# Patient Record
Sex: FEMALE | Race: OTHER | Employment: FULL TIME | URBAN - METROPOLITAN AREA
[De-identification: names, ages, dates, MRNs, and addresses within clinical notes are randomized per-mention and may not be internally consistent; named-entity substitution may affect disease eponyms.]

---
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
## 2021-03-31 ENCOUNTER — HOSPITAL ENCOUNTER (EMERGENCY)
Age: 21
Discharge: HOME | End: 2021-03-31
Attending: EMERGENCY MEDICINE
Payer: COMMERCIAL

## 2021-03-31 VITALS
HEART RATE: 108 BPM | WEIGHT: 180 LBS | RESPIRATION RATE: 16 BRPM | SYSTOLIC BLOOD PRESSURE: 140 MMHG | TEMPERATURE: 98 F | OXYGEN SATURATION: 100 % | BODY MASS INDEX: 33.99 KG/M2 | DIASTOLIC BLOOD PRESSURE: 99 MMHG | HEIGHT: 61 IN

## 2021-03-31 DIAGNOSIS — L30.0 NUMMULAR ECZEMATOUS DERMATITIS: Primary | ICD-10-CM

## 2021-03-31 PROCEDURE — 99282 EMERGENCY DEPT VISIT SF MDM: CPT

## 2021-03-31 RX ORDER — FLUTICASONE PROPIONATE 0.05 MG/G
OINTMENT TOPICAL 2 TIMES DAILY
Qty: 15 G | Refills: 0 | Status: SHIPPED | OUTPATIENT
Start: 2021-03-31

## 2021-03-31 RX ORDER — DIPHENHYDRAMINE HCL 25 MG
25 CAPSULE ORAL
Qty: 20 CAP | Refills: 0 | Status: SHIPPED | OUTPATIENT
Start: 2021-03-31 | End: 2021-04-10

## 2021-03-31 NOTE — ED PROVIDER NOTES
EMERGENCY DEPARTMENT HISTORY AND PHYSICAL EXAM    Date: 3/31/2021  Patient Name: Nisha Mauricio    History of Presenting Illness     Chief Complaint   Patient presents with    Allergic Reaction         History Provided By: Patient    Additional History (Context):   Nisha Mauricio is a 24 y.o. female with PMHX seborrheic dermatitis presents to the emergency department via private vehicle C/O hand rash x1 day. Patient reports that she tried over-the-counter cortisone and his friend that it may be worse. She reports that the rash stings and is pruritic. Pt denies chest pain, shortness of breath, sore throat, throat swelling, and any other sxs or complaints. No relieving or exacerbating factors identified. Denies any mucosal rashes. Denies any rashes to her genitals. PCP: Darlene Le MD    Current Outpatient Medications   Medication Sig Dispense Refill    fluticasone propionate (CUTIVASE) 0.005 % ointment Apply  to affected area two (2) times a day. apply thin layer as directed 15 g 0    diphenhydrAMINE (BenadryL) 25 mg capsule Take 1 Cap by mouth every six (6) hours as needed for Itching or Skin Irritation for up to 10 days. 20 Cap 0    omeprazole (PRILOSEC OTC) 20 mg tablet Take 20 mg by mouth daily.  fluticasone propionate (FLOVENT IN) Take  by inhalation.  ibuprofen (MOTRIN IB) 200 mg tablet Take  by mouth. Past History     Past Medical History:  Past Medical History:   Diagnosis Date    Esophageal problem     Food allergy        Past Surgical History:  No past surgical history on file. Family History:  No family history on file. Social History:  Social History     Tobacco Use    Smoking status: Never Smoker    Smokeless tobacco: Never Used   Substance Use Topics    Alcohol use: No    Drug use: No       Allergies:   Allergies   Allergen Reactions    Green Bean Anaphylaxis    Peanut Anaphylaxis    Sesame Seed Unknown (comments)     Allergy testing    Tree Nut Unknown (comments)     Allergy testing           Review of Systems   Review of Systems   Constitutional: Negative for chills and fever. HENT: Negative for congestion, ear pain, sinus pain and sore throat. Eyes: Negative for pain and visual disturbance. Respiratory: Negative for cough and shortness of breath. Cardiovascular: Negative for chest pain and leg swelling. Gastrointestinal: Negative for abdominal pain, constipation, diarrhea, nausea and vomiting. Genitourinary: Negative for dysuria, hematuria, vaginal bleeding and vaginal discharge. Musculoskeletal: Negative for back pain and neck pain. Skin: Positive for rash. Negative for pallor. Neurological: Negative for dizziness, tremors, weakness, light-headedness and headaches. All other systems reviewed and are negative. Physical Exam     Vitals:    03/31/21 0134   BP: (!) 140/99   Pulse: (!) 108   Resp: 16   Temp: 98 °F (36.7 °C)   SpO2: 100%   Weight: 81.6 kg (180 lb)   Height: 5' 1\" (1.549 m)     Physical Exam    Nursing note and vitals reviewed    Constitutional: Sanket Damon female, no acute distress  Head: Normocephalic, Atraumatic  Eyes: Pupils are equal, round, and reactive to light, EOMI  Neck: Supple, non-tender  Chest: Normal work of breathing and chest excursion bilaterally  Back: No evidence of trauma or deformity  Extremities: No evidence of trauma or deformity, no LE edema. No streaking erythema, vesicular lesions, ulcerations or bulla  Skin: Warm and dry, normal cap refill. Circular scattered erythematous and slightly raised maculopapular rash along the palmar aspects of both hands. Negative Nikolsky sign. No sloughing. Neuro: Alert and appropriate, CN intact, normal speech, moving all 4 extremities freely and symmetrically  Psychiatric: Normal mood and affect       Diagnostic Study Results     Labs -   No results found for this or any previous visit (from the past 12 hour(s)).     Radiologic Studies -   No orders to display     CT Results  (Last 48 hours)    None        CXR Results  (Last 48 hours)    None            Medical Decision Making   I am the first provider for this patient. I reviewed the vital signs, available nursing notes, past medical history, past surgical history, family history and social history. Vital Signs-Reviewed the patient's vital signs. Pulse Oximetry Analysis -100% on room air    Cardiac Monitor:  Rate: 108 bpm  Rhythm: Regular    Records Reviewed: Nursing Notes and Old Medical Records    Provider Notes:   24 y.o. female presenting with a rash to her hands. On exam patient has maculopapular slightly raised erythematous circular rashes scattered along the palmar aspects of both hands. No sloughing. Negative Nikolsky sign. Patient's exam is most consistent with palmar nummular dermatitis. Will discharge with topical steroids and Benadryl. Procedures:  Procedures    ED Course:   1:38 AM   Initial assessment performed. The patients presenting problems have been discussed, and they are in agreement with the care plan formulated and outlined with them. I have encouraged them to ask questions as they arise throughout their visit. Diagnosis and Disposition       DISCHARGE NOTE:  2:06 AM    Austin Flynn's  results have been reviewed with her. She has been counseled regarding her diagnosis, treatment, and plan. She verbally conveys understanding and agreement of the signs, symptoms, diagnosis, treatment and prognosis and additionally agrees to follow up as discussed. She also agrees with the care-plan and conveys that all of her questions have been answered. I have also provided discharge instructions for her that include: educational information regarding their diagnosis and treatment, and list of reasons why they would want to return to the ED prior to their follow-up appointment, should her condition change. She has been provided with education for proper emergency department utilization. CLINICAL IMPRESSION:    1. Nummular eczematous dermatitis        PLAN:  1. D/C Home  2. Discharge Medication List as of 3/31/2021  1:55 AM      START taking these medications    Details   fluticasone propionate (CUTIVASE) 0.005 % ointment Apply  to affected area two (2) times a day. apply thin layer as directed, Normal, Disp-15 g, R-0      diphenhydrAMINE (BenadryL) 25 mg capsule Take 1 Cap by mouth every six (6) hours as needed for Itching or Skin Irritation for up to 10 days. , Normal, Disp-20 Cap, R-0         CONTINUE these medications which have NOT CHANGED    Details   omeprazole (PRILOSEC OTC) 20 mg tablet Take 20 mg by mouth daily. , Historical Med      fluticasone propionate (FLOVENT IN) Take  by inhalation. , Historical Med      ibuprofen (MOTRIN IB) 200 mg tablet Take  by mouth., Historical Med           3. Follow-up Information     Follow up With Specialties Details Why Contact Info    your PCP  Schedule an appointment as soon as possible for a visit in 2 days      [facility] EMERGENCY DEPT Emergency Medicine  As needed if symptoms worsen [address] [id number]  [phone]        ____________________________________     Please note that this dictation was completed with Fast[employer], the computer voice recognition software. Quite often unanticipated grammatical, syntax, homophones, and other interpretive errors are inadvertently transcribed by the computer software. Please disregard these errors. Please excuse any errors that have escaped final proofreading.

## 2021-07-23 ENCOUNTER — HOSPITAL ENCOUNTER (EMERGENCY)
Age: 21
Discharge: LEFT BEFORE DISCHARGE | End: 2021-07-23
Attending: EMERGENCY MEDICINE
Payer: COMMERCIAL

## 2021-07-23 VITALS
RESPIRATION RATE: 14 BRPM | OXYGEN SATURATION: 100 % | HEART RATE: 98 BPM | SYSTOLIC BLOOD PRESSURE: 144 MMHG | TEMPERATURE: 99 F | WEIGHT: 180 LBS | HEIGHT: 61 IN | DIASTOLIC BLOOD PRESSURE: 82 MMHG | BODY MASS INDEX: 33.99 KG/M2

## 2021-07-23 PROCEDURE — 75810000275 HC EMERGENCY DEPT VISIT NO LEVEL OF CARE

## 2022-06-25 ENCOUNTER — HOSPITAL ENCOUNTER (EMERGENCY)
Age: 22
Discharge: LEFT BEFORE DISCHARGE | End: 2022-06-25
Payer: COMMERCIAL

## 2022-06-25 VITALS
WEIGHT: 190 LBS | TEMPERATURE: 99.5 F | BODY MASS INDEX: 35.87 KG/M2 | HEART RATE: 102 BPM | RESPIRATION RATE: 18 BRPM | SYSTOLIC BLOOD PRESSURE: 153 MMHG | HEIGHT: 61 IN | OXYGEN SATURATION: 100 % | DIASTOLIC BLOOD PRESSURE: 85 MMHG

## 2022-06-25 PROCEDURE — 75810000275 HC EMERGENCY DEPT VISIT NO LEVEL OF CARE

## 2022-06-25 NOTE — ED TRIAGE NOTES
Pt presents to ED ambulatory from triage with c/o cough and congestion that started Wednesday; Pt sts she was seen at [facility] and strep, flu and covid all neg;   Pt sts she does not feel better;

## 2024-02-04 ENCOUNTER — HOSPITAL ENCOUNTER (EMERGENCY)
Facility: HOSPITAL | Age: 24
Discharge: HOME | End: 2024-02-05
Payer: COMMERCIAL

## 2024-02-04 DIAGNOSIS — N94.6 DYSMENORRHEA: ICD-10-CM

## 2024-02-04 DIAGNOSIS — M54.50 LOW BACK PAIN WITHOUT SCIATICA, UNSPECIFIED BACK PAIN LATERALITY, UNSPECIFIED CHRONICITY: Primary | ICD-10-CM

## 2024-02-04 PROCEDURE — 99284 EMERGENCY DEPT VISIT MOD MDM: CPT

## 2024-02-04 ASSESSMENT — PAIN DESCRIPTION - LOCATION: LOCATION: BACK

## 2024-02-04 ASSESSMENT — PAIN SCALES - GENERAL: PAINLEVEL_OUTOF10: 10

## 2024-02-04 ASSESSMENT — PAIN - FUNCTIONAL ASSESSMENT: PAIN_FUNCTIONAL_ASSESSMENT: 0-10

## 2024-02-04 ASSESSMENT — PAIN DESCRIPTION - ORIENTATION: ORIENTATION: RIGHT;LEFT;LOWER

## 2024-02-05 ENCOUNTER — APPOINTMENT (OUTPATIENT)
Facility: HOSPITAL | Age: 24
End: 2024-02-05
Payer: COMMERCIAL

## 2024-02-05 VITALS
WEIGHT: 185 LBS | OXYGEN SATURATION: 98 % | BODY MASS INDEX: 34.93 KG/M2 | DIASTOLIC BLOOD PRESSURE: 64 MMHG | RESPIRATION RATE: 20 BRPM | HEART RATE: 78 BPM | HEIGHT: 61 IN | SYSTOLIC BLOOD PRESSURE: 128 MMHG | TEMPERATURE: 98.4 F

## 2024-02-05 LAB
ALBUMIN SERPL-MCNC: 3.7 G/DL (ref 3.4–5)
ALBUMIN/GLOB SERPL: 0.8 (ref 0.8–1.7)
ALP SERPL-CCNC: 98 U/L (ref 45–117)
ALT SERPL-CCNC: 16 U/L (ref 13–56)
ANION GAP SERPL CALC-SCNC: 5 MMOL/L (ref 3–18)
APPEARANCE UR: CLEAR
AST SERPL-CCNC: 14 U/L (ref 10–38)
BACTERIA URNS QL MICRO: ABNORMAL /HPF
BASOPHILS # BLD: 0.1 K/UL (ref 0–0.1)
BASOPHILS NFR BLD: 1 % (ref 0–2)
BILIRUB SERPL-MCNC: 0.5 MG/DL (ref 0.2–1)
BILIRUB UR QL: NEGATIVE
BUN SERPL-MCNC: 12 MG/DL (ref 7–18)
BUN/CREAT SERPL: 16 (ref 12–20)
CALCIUM SERPL-MCNC: 9.5 MG/DL (ref 8.5–10.1)
CHLORIDE SERPL-SCNC: 108 MMOL/L (ref 100–111)
CO2 SERPL-SCNC: 25 MMOL/L (ref 21–32)
COLOR UR: YELLOW
CREAT SERPL-MCNC: 0.77 MG/DL (ref 0.6–1.3)
DIFFERENTIAL METHOD BLD: ABNORMAL
EOSINOPHIL # BLD: 0.1 K/UL (ref 0–0.4)
EOSINOPHIL NFR BLD: 2 % (ref 0–5)
EPITH CASTS URNS QL MICRO: ABNORMAL /LPF (ref 0–5)
ERYTHROCYTE [DISTWIDTH] IN BLOOD BY AUTOMATED COUNT: 13.2 % (ref 11.6–14.5)
GLOBULIN SER CALC-MCNC: 4.4 G/DL (ref 2–4)
GLUCOSE SERPL-MCNC: 100 MG/DL (ref 74–99)
GLUCOSE UR STRIP.AUTO-MCNC: NEGATIVE MG/DL
HCG SERPL QL: NEGATIVE
HCT VFR BLD AUTO: 39.4 % (ref 35–45)
HGB BLD-MCNC: 13 G/DL (ref 12–16)
HGB UR QL STRIP: ABNORMAL
IMM GRANULOCYTES # BLD AUTO: 0.1 K/UL (ref 0–0.04)
IMM GRANULOCYTES NFR BLD AUTO: 1 % (ref 0–0.5)
KETONES UR QL STRIP.AUTO: NEGATIVE MG/DL
LEUKOCYTE ESTERASE UR QL STRIP.AUTO: NEGATIVE
LIPASE SERPL-CCNC: 40 U/L (ref 13–75)
LYMPHOCYTES # BLD: 3.7 K/UL (ref 0.9–3.6)
LYMPHOCYTES NFR BLD: 40 % (ref 21–52)
MCH RBC QN AUTO: 26.7 PG (ref 24–34)
MCHC RBC AUTO-ENTMCNC: 33 G/DL (ref 31–37)
MCV RBC AUTO: 81.1 FL (ref 78–100)
MONOCYTES # BLD: 0.9 K/UL (ref 0.05–1.2)
MONOCYTES NFR BLD: 10 % (ref 3–10)
NEUTS SEG # BLD: 4.3 K/UL (ref 1.8–8)
NEUTS SEG NFR BLD: 47 % (ref 40–73)
NITRITE UR QL STRIP.AUTO: NEGATIVE
NRBC # BLD: 0 K/UL (ref 0–0.01)
NRBC BLD-RTO: 0 PER 100 WBC
PH UR STRIP: 6.5 (ref 5–8)
PLATELET # BLD AUTO: 512 K/UL (ref 135–420)
PMV BLD AUTO: 9.6 FL (ref 9.2–11.8)
POTASSIUM SERPL-SCNC: 3.8 MMOL/L (ref 3.5–5.5)
PROT SERPL-MCNC: 8.1 G/DL (ref 6.4–8.2)
PROT UR STRIP-MCNC: NEGATIVE MG/DL
RBC # BLD AUTO: 4.86 M/UL (ref 4.2–5.3)
RBC #/AREA URNS HPF: ABNORMAL /HPF (ref 0–5)
SODIUM SERPL-SCNC: 138 MMOL/L (ref 136–145)
SP GR UR REFRACTOMETRY: 1.01 (ref 1–1.03)
UROBILINOGEN UR QL STRIP.AUTO: 0.2 EU/DL (ref 0.2–1)
WBC # BLD AUTO: 9.2 K/UL (ref 4.6–13.2)
WBC URNS QL MICRO: ABNORMAL /HPF (ref 0–5)

## 2024-02-05 PROCEDURE — 84703 CHORIONIC GONADOTROPIN ASSAY: CPT

## 2024-02-05 PROCEDURE — 74176 CT ABD & PELVIS W/O CONTRAST: CPT

## 2024-02-05 PROCEDURE — 80053 COMPREHEN METABOLIC PANEL: CPT

## 2024-02-05 PROCEDURE — 83690 ASSAY OF LIPASE: CPT

## 2024-02-05 PROCEDURE — 85025 COMPLETE CBC W/AUTO DIFF WBC: CPT

## 2024-02-05 PROCEDURE — 81001 URINALYSIS AUTO W/SCOPE: CPT

## 2024-02-05 RX ORDER — METHOCARBAMOL 750 MG/1
750 TABLET, FILM COATED ORAL 4 TIMES DAILY
Qty: 40 TABLET | Refills: 0 | Status: SHIPPED | OUTPATIENT
Start: 2024-02-05 | End: 2024-02-15

## 2024-02-05 RX ORDER — KETOROLAC TROMETHAMINE 10 MG/1
10 TABLET, FILM COATED ORAL EVERY 6 HOURS PRN
Qty: 20 TABLET | Refills: 0 | Status: SHIPPED | OUTPATIENT
Start: 2024-02-05

## 2024-02-05 RX ORDER — ONDANSETRON 2 MG/ML
4 INJECTION INTRAMUSCULAR; INTRAVENOUS ONCE
Status: DISCONTINUED | OUTPATIENT
Start: 2024-02-05 | End: 2024-02-05 | Stop reason: HOSPADM

## 2024-02-05 RX ORDER — KETOROLAC TROMETHAMINE 15 MG/ML
15 INJECTION, SOLUTION INTRAMUSCULAR; INTRAVENOUS ONCE
Status: DISCONTINUED | OUTPATIENT
Start: 2024-02-05 | End: 2024-02-05 | Stop reason: HOSPADM

## 2024-02-05 RX ORDER — METHOCARBAMOL 500 MG/1
1500 TABLET, FILM COATED ORAL
Status: DISCONTINUED | OUTPATIENT
Start: 2024-02-05 | End: 2024-02-05 | Stop reason: HOSPADM

## 2024-02-05 ASSESSMENT — PAIN SCALES - GENERAL: PAINLEVEL_OUTOF10: 0

## 2024-02-05 NOTE — ED NOTES
Pt refuses analgesic or antiemetic, pt reports pain controlled at 0 out of 10 and no nausea currently present, will continue to monitor

## 2024-02-05 NOTE — ED NOTES
Pt discharged per order, pt educated on discharge instructions and follow up, pt verbalized understanding of education with REBA kothari, pt discharged with 2 prescriptions, pt educated on medication use and side effects- medications sent to pt's preferred pharmacy, pt kamilah additional questions at this time

## 2024-02-05 NOTE — ED TRIAGE NOTES
Pt arrives to ER by POV with complaint of lower back pain.  Pt is SOTO x 4 and appears in no apparent distress at this time.   Pt states she has back pain with her cycle but this time it is worse and no relief from OTC meds.

## 2024-02-05 NOTE — ED NOTES
Pt reports to ed with complaint of back pain, pt reports back pain is historic with menses but that pain is increased from baseline, pt reports pain is intermittent, pt denies SOB/Abdominal pain or additional symptoms, pt AOX4 independent in room     2 = assistive person

## 2024-02-05 NOTE — ED PROVIDER NOTES
[facility] EMERGENCY DEPT  EMERGENCY DEPARTMENT ENCOUNTER       Pt Name: Guille Okeefe  MRN: [id number]  Birthdate 2000  Date of evaluation: 2/4/2024  PCP: Mila Danielson MD  Note Started: 3:06 AM 2/5/24     CHIEF COMPLAINT       Chief Complaint   Patient presents with    Back Pain     Lower back pain with menstruation        HISTORY OF PRESENT ILLNESS: 1 or more elements      History From: Patient  HPI Limitations: None  Chronic Conditions: Dysmenorrhea, GERD, anemia, menorrhagia, hyperlipidemia, migraines, psoriasis  Social Determinants affecting Dx or Tx: None     Guille Okeefe is a 23 y.o. female with history of dysmenorrhea, GERD, anemia, menorrhagia, hyperlipidemia, migraines, psoriasiswho presents to ED c/o lower back pain x 2 days.  Patient reports she usually has lower back pain during menstruation and is currently menstruating but this is not her normal pain.  Patient reports bilateral lower back/right side pain x 2 days, described as stabbing.  Was constant at onset, now is intermittent without noted aggravating or alleviating factors.  Patient reports intermittent dysuria, denies urinary frequency or urgency, denies noted hematuria, denies vaginal itching discharge or discomfort.  Patient reports episodes of pain are accompanied by nausea but denies vomiting.  No diarrhea or constipation, last BM was today or yesterday, described as normal.  Patient denies fever/chills, URI symptoms, dizziness, headache, chest pain, shortness of breath, focal weakness or paresthesias, recent spinal procedure, IV drug use, loss of control of bladder or bowels, saddle anesthesia, abdominal pain.  Patient has been taking OTC ibuprofen, last took several hours ago, states that has not helped.  Patient reports she was having a hard time performing ADLs earlier due to the pain.     Nursing Notes were all reviewed and agreed with or any disagreements were addressed in the HPI.    PAST HISTORY     Past Medical

## 2024-02-05 NOTE — ED PROVIDER NOTES
FACE-TO-FACE PROGRESS NOTE:  ***  The patient presents with ***.  My exam shows ***.  Imp/plan: ***.   I personally saw and performed a substantive portion of the visit including the medical decision making and have personally seen and examined the patient, reviewed the BORIS's note and agree with findings and plan.  Written by Narayan Ruby MD

## 2024-02-09 ENCOUNTER — HOSPITAL ENCOUNTER (OUTPATIENT)
Facility: HOSPITAL | Age: 24
Discharge: HOME | End: 2024-02-12

## 2024-02-09 LAB — SENTARA SPECIMEN COLLECTION: NORMAL

## 2024-02-14 ENCOUNTER — HOSPITAL ENCOUNTER (OUTPATIENT)
Facility: HOSPITAL | Age: 24
Setting detail: SPECIMEN
Discharge: HOME | End: 2024-02-17
Attending: OBSTETRICS & GYNECOLOGY
Payer: COMMERCIAL

## 2024-02-14 PROCEDURE — 88305 TISSUE EXAM BY PATHOLOGIST: CPT

## 2024-12-15 ENCOUNTER — HOSPITAL ENCOUNTER (EMERGENCY)
Facility: HOSPITAL | Age: 24
Discharge: HOME | End: 2024-12-15
Payer: COMMERCIAL

## 2024-12-15 ENCOUNTER — APPOINTMENT (OUTPATIENT)
Facility: HOSPITAL | Age: 24
End: 2024-12-15
Payer: COMMERCIAL

## 2024-12-15 VITALS
OXYGEN SATURATION: 100 % | BODY MASS INDEX: 35.87 KG/M2 | SYSTOLIC BLOOD PRESSURE: 142 MMHG | RESPIRATION RATE: 15 BRPM | HEIGHT: 61 IN | WEIGHT: 190 LBS | TEMPERATURE: 98.2 F | DIASTOLIC BLOOD PRESSURE: 91 MMHG | HEART RATE: 97 BPM

## 2024-12-15 DIAGNOSIS — S90.121A CONTUSION OF LESSER TOE OF RIGHT FOOT WITHOUT DAMAGE TO NAIL, INITIAL ENCOUNTER: Primary | ICD-10-CM

## 2024-12-15 PROCEDURE — 73630 X-RAY EXAM OF FOOT: CPT

## 2024-12-15 PROCEDURE — 99283 EMERGENCY DEPT VISIT LOW MDM: CPT

## 2024-12-15 ASSESSMENT — PAIN SCALES - GENERAL: PAINLEVEL_OUTOF10: 4

## 2024-12-15 ASSESSMENT — PAIN - FUNCTIONAL ASSESSMENT: PAIN_FUNCTIONAL_ASSESSMENT: 0-10

## 2024-12-16 NOTE — ED PROVIDER NOTES
Needs (6/24/2024)    Received from [facility]    PRAPARE - Transportation     Lack of Transportation (Medical): No     Lack of Transportation (Non-Medical): No   Physical Activity: Insufficiently Active (5/26/2023)    Received from [facility]    Exercise Vital Sign     Days of Exercise per Week: 3 days     Minutes of Exercise per Session: 30 min   Stress: No Stress Concern Present (5/26/2023)    Received from [facility]    [place] [employer] - Occupational Stress Questionnaire     Feeling of Stress : Only a little   Social Connections: Moderately Integrated (5/26/2023)    Received from [facility]    Social Connection and Isolation Panel [NHANES]     Frequency of Communication with Friends and Family: More than three times a week     Frequency of Social Gatherings with Friends and Family: Three times a week     Attends Christianity Services: 1 to 4 times per year     Active Member of Clubs or Organizations: No     Attends Club or Organization Meetings: Never     Marital Status: Living with partner   Intimate Partner Violence: Not At Risk (5/26/2023)    Received from [facility]    Humiliation, Afraid, Rape, and Kick questionnaire     Fear of Current or Ex-Partner: No     Emotionally Abused: No     Physically Abused: No     Sexually Abused: No   Housing Stability: Low Risk  (5/26/2023)    Received from [facility]    Housing Stability Vital Sign     Unable to Pay for Housing in the Last Year: No     Number of Places Lived in the Last Year: 1     Unstable Housing in the Last Year: No       Allergies:  Allergies   Allergen Reactions    Other Anaphylaxis    Macadamia Nut Oil      Other reaction(s): Unknown (comments)  Allergy testing    Peanut (Diagnostic) Rash       CURRENT MEDICATIONS      No current

## 2024-12-16 NOTE — ED TRIAGE NOTES
Patient ambulatory in ED for toes pain.  Patient states that she kicked a chair. Patient states the pain is in her 4th toe on the R foot.

## 2024-12-16 NOTE — DISCHARGE INSTRUCTIONS
Rest of the right foot, elevate and ice the area.  The postop shoe should be worn if you are up and moving around, you can take it off if you are resting with your foot elevated.  If you have continued pain past the next few days, you could follow-up with podiatry as needed.  Otherwise I would recommend taking ibuprofen 600 mg every 8 hours